# Patient Record
Sex: MALE | Race: WHITE
[De-identification: names, ages, dates, MRNs, and addresses within clinical notes are randomized per-mention and may not be internally consistent; named-entity substitution may affect disease eponyms.]

---

## 2020-07-26 ENCOUNTER — ERX REFILL RESPONSE (OUTPATIENT)
Age: 60
End: 2020-07-26

## 2020-07-26 RX ORDER — LANSOPRAZOLE 30 MG/1
TAKE 1 CAPSULE BY MOUTH ONCE DAILY BEFORE A MEAL CAPSULE, DELAYED RELEASE ORAL
Qty: 90 | Refills: 3 | OUTPATIENT

## 2020-07-26 RX ORDER — LANSOPRAZOLE 30 MG/1
TAKE 1 CAPSULE BY MOUTH ONCE DAILY BEFORE A MEAL CAPSULE, DELAYED RELEASE ORAL
Qty: 90 CAPSULE | Refills: 3 | OUTPATIENT

## 2020-07-27 ENCOUNTER — ERX REFILL RESPONSE (OUTPATIENT)
Age: 60
End: 2020-07-27

## 2020-07-27 RX ORDER — LANSOPRAZOLE 30 MG/1
TAKE 1 CAPSULE BY MOUTH EVERY DAY BEFORE A MEAL CAPSULE, DELAYED RELEASE ORAL
Qty: 90 | Refills: 3 | OUTPATIENT

## 2020-07-27 RX ORDER — LANSOPRAZOLE 30 MG/1
TAKE 1 CAPSULE BY MOUTH EVERY DAY BEFORE A MEAL CAPSULE, DELAYED RELEASE ORAL
Qty: 90 CAPSULE | Refills: 3 | OUTPATIENT

## 2020-07-30 ENCOUNTER — TELEPHONE ENCOUNTER (OUTPATIENT)
Dept: URBAN - METROPOLITAN AREA CLINIC 92 | Facility: CLINIC | Age: 60
End: 2020-07-30

## 2020-07-30 ENCOUNTER — ERX REFILL RESPONSE (OUTPATIENT)
Age: 60
End: 2020-07-30

## 2020-07-30 PROBLEM — 302914006: Status: ACTIVE | Noted: 2020-07-30

## 2020-07-30 RX ORDER — METOPROLOL SUCCINATE 25 MG/1
TABLET, EXTENDED RELEASE ORAL
Qty: 0 | Refills: 0 | Status: ACTIVE | COMMUNITY
Start: 1900-01-01 | End: 1900-01-01

## 2020-07-30 RX ORDER — LISINOPRIL 10 MG/1
TABLET ORAL
Qty: 0 | Refills: 0 | Status: ACTIVE | COMMUNITY
Start: 1900-01-01 | End: 1900-01-01

## 2020-07-30 RX ORDER — LANSOPRAZOLE 30 MG/1
TAKE 1 CAPSULE (30 MG) BY ORAL ROUTE ONCE DAILY BEFORE A MEAL FOR 90 DAYS CAPSULE, DELAYED RELEASE ORAL 1
Qty: 90 | Refills: 3 | Status: ACTIVE | COMMUNITY
Start: 2020-01-30 | End: 2021-01-24

## 2020-07-30 RX ORDER — PANTOPRAZOLE SODIUM 40 MG/1
1 TABLET TABLET, DELAYED RELEASE ORAL ONCE A DAY
Qty: 90 | Refills: 3 | OUTPATIENT
Start: 2020-07-30

## 2020-07-30 RX ORDER — ATORVASTATIN CALCIUM 80 MG/1
TABLET, FILM COATED ORAL
Qty: 0 | Refills: 0 | Status: ACTIVE | COMMUNITY
Start: 1900-01-01 | End: 1900-01-01

## 2020-07-30 RX ORDER — TICAGRELOR 90 MG/1
TAKE 1 TABLET (90 MG) BY ORAL ROUTE 2 TIMES PER DAY TABLET ORAL 2
Qty: 0 | Refills: 0 | Status: ACTIVE | COMMUNITY
Start: 1900-01-01 | End: 1900-01-01

## 2020-07-30 RX ORDER — LANSOPRAZOLE 30 MG/1
TAKE 1 CAPSULE BY MOUTH EVERY DAY BEFORE A MEAL CAPSULE, DELAYED RELEASE ORAL
Qty: 90 CAPSULE | Refills: 3 | Status: ACTIVE | COMMUNITY

## 2021-07-21 ENCOUNTER — ERX REFILL RESPONSE (OUTPATIENT)
Dept: URBAN - METROPOLITAN AREA CLINIC 74 | Facility: CLINIC | Age: 61
End: 2021-07-21

## 2021-07-21 RX ORDER — PANTOPRAZOLE SODIUM 40 MG/1
1 TABLET TABLET, DELAYED RELEASE ORAL ONCE A DAY
Qty: 90 | Refills: 3 | OUTPATIENT

## 2021-07-21 RX ORDER — PANTOPRAZOLE SODIUM 40 MG/1
TAKE ONE TABLET BY MOUTH DAILY TABLET, DELAYED RELEASE ORAL
Qty: 90 TABLET | Refills: 1 | OUTPATIENT

## 2021-10-18 ENCOUNTER — ERX REFILL RESPONSE (OUTPATIENT)
Dept: URBAN - METROPOLITAN AREA CLINIC 74 | Facility: CLINIC | Age: 61
End: 2021-10-18

## 2021-10-18 RX ORDER — PANTOPRAZOLE SODIUM 40 MG/1
TAKE 1 TABLET BY MOUTH EVERY DAY TABLET, DELAYED RELEASE ORAL
Qty: 90 TABLET | Refills: 1 | OUTPATIENT

## 2021-10-18 RX ORDER — PANTOPRAZOLE SODIUM 40 MG/1
TAKE ONE TABLET BY MOUTH DAILY TABLET, DELAYED RELEASE ORAL
Qty: 90 TABLET | Refills: 1 | OUTPATIENT

## 2022-01-24 ENCOUNTER — ERX REFILL RESPONSE (OUTPATIENT)
Dept: URBAN - METROPOLITAN AREA CLINIC 74 | Facility: CLINIC | Age: 62
End: 2022-01-24

## 2022-01-24 RX ORDER — PANTOPRAZOLE SODIUM 40 MG/1
TAKE 1 TABLET BY MOUTH EVERY DAY TABLET, DELAYED RELEASE ORAL
Qty: 90 TABLET | Refills: 1 | OUTPATIENT

## 2022-04-25 ENCOUNTER — ERX REFILL RESPONSE (OUTPATIENT)
Dept: URBAN - METROPOLITAN AREA CLINIC 74 | Facility: CLINIC | Age: 62
End: 2022-04-25

## 2022-04-25 RX ORDER — PANTOPRAZOLE SODIUM 40 MG/1
TAKE 1 TABLET BY MOUTH EVERY DAY TABLET, DELAYED RELEASE ORAL
Qty: 90 TABLET | Refills: 1 | OUTPATIENT

## 2022-07-11 PROBLEM — 16093611000119107: Status: ACTIVE | Noted: 2022-07-11

## 2022-07-11 PROBLEM — 84089009: Status: ACTIVE | Noted: 2022-07-11

## 2022-07-11 PROBLEM — 90458007: Status: ACTIVE | Noted: 2022-07-11

## 2022-07-11 PROBLEM — 196735001: Status: ACTIVE | Noted: 2022-07-11

## 2022-07-11 PROBLEM — 266435005: Status: ACTIVE | Noted: 2022-07-11

## 2022-07-11 PROBLEM — 429430001: Status: ACTIVE | Noted: 2022-07-11

## 2022-07-12 ENCOUNTER — OFFICE VISIT (OUTPATIENT)
Dept: URBAN - METROPOLITAN AREA CLINIC 74 | Facility: CLINIC | Age: 62
End: 2022-07-12
Payer: MEDICARE

## 2022-07-12 VITALS
BODY MASS INDEX: 22.81 KG/M2 | HEART RATE: 74 BPM | OXYGEN SATURATION: 98 % | WEIGHT: 154 LBS | DIASTOLIC BLOOD PRESSURE: 70 MMHG | TEMPERATURE: 97.7 F | HEIGHT: 69 IN | SYSTOLIC BLOOD PRESSURE: 118 MMHG

## 2022-07-12 DIAGNOSIS — K57.30 SIGMOID DIVERTICULOSIS: ICD-10-CM

## 2022-07-12 DIAGNOSIS — K29.30 CHRONIC SUPERFICIAL GASTRITIS WITHOUT BLEEDING: ICD-10-CM

## 2022-07-12 DIAGNOSIS — Z80.0 FAMILY HISTORY OF COLON CANCER: ICD-10-CM

## 2022-07-12 DIAGNOSIS — Z87.19 HISTORY OF BARRETT'S ESOPHAGUS: ICD-10-CM

## 2022-07-12 DIAGNOSIS — I25.2 HISTORY OF MI (MYOCARDIAL INFARCTION): ICD-10-CM

## 2022-07-12 DIAGNOSIS — K44.9 HIATAL HERNIA: ICD-10-CM

## 2022-07-12 DIAGNOSIS — Z79.01 CHRONIC ANTICOAGULATION: ICD-10-CM

## 2022-07-12 DIAGNOSIS — K64.8 INTERNAL HEMORRHOIDS: ICD-10-CM

## 2022-07-12 DIAGNOSIS — K21.9 GERD WITHOUT ESOPHAGITIS: ICD-10-CM

## 2022-07-12 PROCEDURE — 99213 OFFICE O/P EST LOW 20 MIN: CPT | Performed by: INTERNAL MEDICINE

## 2022-07-12 RX ORDER — ATORVASTATIN CALCIUM 80 MG/1
TABLET, FILM COATED ORAL
Qty: 0 | Refills: 0 | Status: ACTIVE | COMMUNITY
Start: 1900-01-01

## 2022-07-12 RX ORDER — METOPROLOL SUCCINATE 25 MG/1
TABLET, EXTENDED RELEASE ORAL
Qty: 0 | Refills: 0 | Status: ACTIVE | COMMUNITY
Start: 1900-01-01

## 2022-07-12 RX ORDER — LANSOPRAZOLE 30 MG/1
TAKE 1 CAPSULE BY MOUTH EVERY DAY BEFORE A MEAL CAPSULE, DELAYED RELEASE ORAL
OUTPATIENT

## 2022-07-12 RX ORDER — PANTOPRAZOLE SODIUM 40 MG/1
TAKE 1 TABLET BY MOUTH EVERY DAY TABLET, DELAYED RELEASE ORAL
Qty: 90 TABLET | Refills: 1 | Status: ACTIVE | COMMUNITY

## 2022-07-12 RX ORDER — TICAGRELOR 90 MG/1
TAKE 1 TABLET (90 MG) BY ORAL ROUTE 2 TIMES PER DAY TABLET ORAL 2
Qty: 0 | Refills: 0 | Status: ACTIVE | COMMUNITY
Start: 1900-01-01

## 2022-07-12 RX ORDER — PANTOPRAZOLE SODIUM 40 MG/1
TAKE 1 TABLET BY MOUTH EVERY DAY TABLET, DELAYED RELEASE ORAL ONCE A DAY
Qty: 90 | Refills: 3

## 2022-07-12 RX ORDER — LISINOPRIL 10 MG/1
TABLET ORAL
Qty: 0 | Refills: 0 | Status: ACTIVE | COMMUNITY
Start: 1900-01-01

## 2022-07-12 RX ORDER — LANSOPRAZOLE 30 MG/1
TAKE 1 CAPSULE BY MOUTH EVERY DAY BEFORE A MEAL CAPSULE, DELAYED RELEASE ORAL
Qty: 90 CAPSULE | Refills: 3 | Status: ACTIVE | COMMUNITY

## 2022-07-12 NOTE — HPI-TODAY'S VISIT:
Today July 12, 2022 the patient returns for a follow-up visit.  The patient was last seen in the office on January 30, 2020 with gastroesophageal reflux, Metzger's esophagus, tobacco chew use, chronic anticoagulation, stented coronary arteries, history of MI and family history of colon cancer.  At the time of the last visit the patient appeared to be doing well, the patient was taking Prevacid and had no upper GI symptoms, patient had a slight change in bowel habit, stools were softer, there was no rectal bleeding and no abdominal pain.  The patient was advised to get an EGD and a screening colonoscopy after we get cardiology clearance to hold the Brilinta.  The patient had an EGD on March 10, 2020 and it revealed normal duodenal mucosa, antral erythema, a small hiatal hernia and normal esophageal mucosa the colonoscopy performed on the same day revealed sigmoid diverticulosis, small nonbleeding internal hemorrhoids, the rest of the colon was normal and the patient was advised to get a colonoscopy in 5 years.  Biopsies revealed mild chronic H. pylori negative gastritis and squamous epithelium with no significant histopathology, there was no intestinal metaplasia and no intraepithelial eosinophiles.  Today the patient returns to the office stating that he is doing well, denies having any dysphagia, odynophagia, nausea, vomiting, heartburn.  The patient remains on pantoprazole 40 mg daily.  The patient has been instructed to follow antireflux measures and diet and will remain on pantoprazole 40 mg.  The patient will be due to have a surveillance EGD in view of his past history of Metzger's in 2023.  The patient's bowel movements remain normal, he denied having any diarrhea, constipation, rectal bleeding or hematochezia.  His next colonoscopy will be due in 2025.

## 2023-06-24 ENCOUNTER — ERX REFILL RESPONSE (OUTPATIENT)
Dept: URBAN - METROPOLITAN AREA CLINIC 74 | Facility: CLINIC | Age: 63
End: 2023-06-24

## 2023-06-24 RX ORDER — PANTOPRAZOLE SODIUM 40 MG/1
TAKE 1 TABLET BY MOUTH EVERY DAY FOR 90 DAYS TABLET, DELAYED RELEASE ORAL
Qty: 90 TABLET | Refills: 3 | OUTPATIENT

## 2023-06-24 RX ORDER — PANTOPRAZOLE SODIUM 40 MG/1
TAKE 1 TABLET BY MOUTH EVERY DAY TABLET, DELAYED RELEASE ORAL ONCE A DAY
Qty: 90 | Refills: 3 | OUTPATIENT

## 2023-12-14 ENCOUNTER — CLAIMS CREATED FROM THE CLAIM WINDOW (OUTPATIENT)
Dept: URBAN - METROPOLITAN AREA CLINIC 74 | Facility: CLINIC | Age: 63
End: 2023-12-14
Payer: MEDICARE

## 2023-12-14 ENCOUNTER — LAB OUTSIDE AN ENCOUNTER (OUTPATIENT)
Dept: URBAN - METROPOLITAN AREA CLINIC 74 | Facility: CLINIC | Age: 63
End: 2023-12-14

## 2023-12-14 VITALS
HEIGHT: 69 IN | SYSTOLIC BLOOD PRESSURE: 138 MMHG | WEIGHT: 165 LBS | HEART RATE: 68 BPM | BODY MASS INDEX: 24.44 KG/M2 | OXYGEN SATURATION: 98 % | TEMPERATURE: 98.6 F | DIASTOLIC BLOOD PRESSURE: 90 MMHG

## 2023-12-14 DIAGNOSIS — R12 HEARTBURN: ICD-10-CM

## 2023-12-14 DIAGNOSIS — K64.8 INTERNAL HEMORRHOIDS: ICD-10-CM

## 2023-12-14 DIAGNOSIS — K44.9 HIATAL HERNIA: ICD-10-CM

## 2023-12-14 DIAGNOSIS — K29.30 CHRONIC SUPERFICIAL GASTRITIS WITHOUT BLEEDING: ICD-10-CM

## 2023-12-14 DIAGNOSIS — K57.30 SIGMOID DIVERTICULOSIS: ICD-10-CM

## 2023-12-14 DIAGNOSIS — K21.9 GERD WITHOUT ESOPHAGITIS: ICD-10-CM

## 2023-12-14 DIAGNOSIS — Z80.0 FAMILY HISTORY OF COLON CANCER: ICD-10-CM

## 2023-12-14 DIAGNOSIS — Z87.19 HISTORY OF BARRETT'S ESOPHAGUS: ICD-10-CM

## 2023-12-14 DIAGNOSIS — I25.2 HISTORY OF MI (MYOCARDIAL INFARCTION): ICD-10-CM

## 2023-12-14 PROBLEM — 16331000: Status: ACTIVE | Noted: 2023-12-14

## 2023-12-14 PROCEDURE — 99213 OFFICE O/P EST LOW 20 MIN: CPT | Performed by: INTERNAL MEDICINE

## 2023-12-14 RX ORDER — PANTOPRAZOLE SODIUM 40 MG/1
TAKE 1 TABLET BY MOUTH EVERY DAY FOR 90 DAYS TABLET, DELAYED RELEASE ORAL
Qty: 90 TABLET | Refills: 3 | Status: ACTIVE | COMMUNITY

## 2023-12-14 RX ORDER — PANTOPRAZOLE SODIUM 40 MG/1
1 TABLET TABLET, DELAYED RELEASE ORAL ONCE A DAY
Qty: 90 TABLET | Refills: 3

## 2023-12-14 RX ORDER — LISINOPRIL 10 MG/1
TABLET ORAL
Qty: 0 | Refills: 0 | Status: ACTIVE | COMMUNITY
Start: 1900-01-01

## 2023-12-14 RX ORDER — FAMOTIDINE 40 MG/1
1 TABLET AT BEDTIME TABLET, FILM COATED ORAL ONCE A DAY
Qty: 90 TABLET | Refills: 3 | OUTPATIENT
Start: 2023-12-14

## 2023-12-14 RX ORDER — ATORVASTATIN CALCIUM 80 MG/1
TABLET, FILM COATED ORAL
Qty: 0 | Refills: 0 | Status: ACTIVE | COMMUNITY
Start: 1900-01-01

## 2023-12-14 RX ORDER — METOPROLOL SUCCINATE 25 MG/1
TABLET, EXTENDED RELEASE ORAL
Qty: 0 | Refills: 0 | Status: ACTIVE | COMMUNITY
Start: 1900-01-01

## 2023-12-14 NOTE — HPI-TODAY'S VISIT:
Today July 12, 2022 the patient returns for a follow-up visit.  The patient was last seen in the office on January 30, 2020 with gastroesophageal reflux, Metzegr's esophagus, tobacco chew use, chronic anticoagulation, stented coronary arteries, history of MI and family history of colon cancer.  At the time of the last visit the patient appeared to be doing well, the patient was taking Prevacid and had no upper GI symptoms, patient had a slight change in bowel habit, stools were softer, there was no rectal bleeding and no abdominal pain.  The patient was advised to get an EGD and a screening colonoscopy after we get cardiology clearance to hold the Brilinta.  The patient had an EGD on March 10, 2020 and it revealed normal duodenal mucosa, antral erythema, a small hiatal hernia and normal esophageal mucosa the colonoscopy performed on the same day revealed sigmoid diverticulosis, small nonbleeding internal hemorrhoids, the rest of the colon was normal and the patient was advised to get a colonoscopy in 5 years.  Biopsies revealed mild chronic H. pylori negative gastritis and squamous epithelium with no significant histopathology, there was no intestinal metaplasia and no intraepithelial eosinophiles.  Today the patient returns to the office stating that he is doing well, denies having any dysphagia, odynophagia, nausea, vomiting, heartburn.  The patient remains on pantoprazole 40 mg daily.  The patient has been instructed to follow antireflux measures and diet and will remain on pantoprazole 40 mg.  The patient will be due to have a surveillance EGD in view of his past history of Metzger's in 2023.  The patient's bowel movements remain normal, he denied having any diarrhea, constipation, rectal bleeding or hematochezia.  His next colonoscopy will be due in 2025.  Today December 14, 2023 the patient returns for a follow-up visit, the patient was last seen on July 12, 2022 with GERD without esophagitis, history of Metzger's esophagus, hiatal hernia, chronic superficial gastritis without bleeding, sigmoid diverticulosis, internal hemorrhoids, family history of colon cancer, history of MI on chronic anticoagulation.  At the time of the last visit the patient appeared to be doing well, denied having any upper GI symptoms such as dysphagia, odynophagia, nausea, vomiting, heartburn.  The patient was taking pantoprazole 40 mg daily. The patient was instructed to follow antireflux measures and diet and remain on pantoprazole 40 mg daily.  The patient was advised to get an EGD in view of the past history of Metzger's esophagus.    The last EGD was performed on March 10, 2020 and it revealed a small hiatal hernia mild chronic gastritis and distal esophageal biopsies showed squamous epithelium with no significant histopathology, no intraepithelial eosinophiles were seen, biopsies obtained in 2017 were consistent with mild reflux esophagitis negative for intestinal metaplasia.  Today the patient returns to the office stating that he ran out of pantoprazole, he tried to take his wife's PPI with no improvement.  He claims that while taking the pantoprazole it stopped working as good as it used to be, he used to take 40 mg in the a.m. and had no symptoms for 24 hours, lately he started getting acid reflux by evening time.  The patient was not following antireflux measures or diet, denied having any dysphagia, odynophagia, had intermittent episodes of nausea mostly on an empty stomach had occasional vomiting of gastric contents with no blood.  There was no melena.The patient is currently taking a low-dose aspirin, there is no history of alcohol use, does dip snuff, there is no tobacco smoking. Currently the patient is having type V or type VI stools on the [place] scale, the patient's last colonoscopy in 2020 failed to show any polyps.  The patient did have sigmoid diverticulosis and nonbleeding internal hemorrhoids.  Currently he defecates once a day and there has been no hematochezia.  There has been no unexplained weight loss. The patient has been advised to follow antireflux measures and diet, will add famotidine 40 mg at bedtime to pantoprazole 40 mg in the a.m.  The patient will be scheduled to have a follow-up EGD.  Benefits potential complications and alternatives to EGD were disclosed.  The patient will be due to have a colonoscopy in March 2025. The patient should remain on a high-fiber diet.

## 2024-02-13 ENCOUNTER — EGD (OUTPATIENT)
Dept: URBAN - METROPOLITAN AREA SURGERY CENTER 30 | Facility: SURGERY CENTER | Age: 64
End: 2024-02-13
Payer: MEDICARE

## 2024-02-13 ENCOUNTER — LAB (OUTPATIENT)
Dept: URBAN - METROPOLITAN AREA CLINIC 4 | Facility: CLINIC | Age: 64
End: 2024-02-13
Payer: MEDICARE

## 2024-02-13 DIAGNOSIS — K31.89 OTHER DISEASES OF STOMACH AND DUODENUM: ICD-10-CM

## 2024-02-13 DIAGNOSIS — K21.9 ACID REFLUX: ICD-10-CM

## 2024-02-13 DIAGNOSIS — K29.70 GASTRITIS, UNSPECIFIED, WITHOUT BLEEDING: ICD-10-CM

## 2024-02-13 DIAGNOSIS — K22.2 ACQUIRED ESOPHAGEAL RING: ICD-10-CM

## 2024-02-13 PROCEDURE — 43239 EGD BIOPSY SINGLE/MULTIPLE: CPT | Performed by: INTERNAL MEDICINE

## 2024-02-13 PROCEDURE — 88312 SPECIAL STAINS GROUP 1: CPT | Performed by: PATHOLOGY

## 2024-02-13 PROCEDURE — 88305 TISSUE EXAM BY PATHOLOGIST: CPT | Performed by: PATHOLOGY

## 2024-03-11 ENCOUNTER — OV EP (OUTPATIENT)
Dept: URBAN - METROPOLITAN AREA CLINIC 74 | Facility: CLINIC | Age: 64
End: 2024-03-11

## 2024-03-13 PROBLEM — 37693008: Status: ACTIVE | Noted: 2024-03-13

## 2024-03-21 ENCOUNTER — OV EP (OUTPATIENT)
Dept: URBAN - METROPOLITAN AREA CLINIC 74 | Facility: CLINIC | Age: 64
End: 2024-03-21
Payer: MEDICARE

## 2024-03-21 VITALS
BODY MASS INDEX: 24.44 KG/M2 | WEIGHT: 165 LBS | HEIGHT: 69 IN | DIASTOLIC BLOOD PRESSURE: 78 MMHG | HEART RATE: 74 BPM | SYSTOLIC BLOOD PRESSURE: 118 MMHG | TEMPERATURE: 97.2 F

## 2024-03-21 DIAGNOSIS — K29.60 CHEMICAL GASTRITIS: ICD-10-CM

## 2024-03-21 DIAGNOSIS — I25.2 HISTORY OF MI (MYOCARDIAL INFARCTION): ICD-10-CM

## 2024-03-21 DIAGNOSIS — Z80.0 FAMILY HISTORY OF COLON CANCER: ICD-10-CM

## 2024-03-21 DIAGNOSIS — K57.30 SIGMOID DIVERTICULOSIS: ICD-10-CM

## 2024-03-21 DIAGNOSIS — K64.8 INTERNAL HEMORRHOIDS: ICD-10-CM

## 2024-03-21 DIAGNOSIS — R12 HEARTBURN: ICD-10-CM

## 2024-03-21 DIAGNOSIS — K21.9 GERD WITHOUT ESOPHAGITIS: ICD-10-CM

## 2024-03-21 DIAGNOSIS — K44.9 HIATAL HERNIA: ICD-10-CM

## 2024-03-21 DIAGNOSIS — Z87.19 HISTORY OF BARRETT'S ESOPHAGUS: ICD-10-CM

## 2024-03-21 PROCEDURE — 99213 OFFICE O/P EST LOW 20 MIN: CPT | Performed by: INTERNAL MEDICINE

## 2024-03-21 RX ORDER — FAMOTIDINE 40 MG/1
1 TABLET AT BEDTIME TABLET, FILM COATED ORAL ONCE A DAY
Qty: 90 TABLET | Refills: 3 | Status: ACTIVE | COMMUNITY
Start: 2023-12-14

## 2024-03-21 RX ORDER — PANTOPRAZOLE SODIUM 40 MG/1
1 TABLET TABLET, DELAYED RELEASE ORAL ONCE A DAY
OUTPATIENT

## 2024-03-21 RX ORDER — PANTOPRAZOLE SODIUM 40 MG/1
1 TABLET TABLET, DELAYED RELEASE ORAL ONCE A DAY
Qty: 90 TABLET | Refills: 3 | Status: ACTIVE | COMMUNITY

## 2024-03-21 RX ORDER — LISINOPRIL 10 MG/1
TABLET ORAL
Qty: 0 | Refills: 0 | Status: ACTIVE | COMMUNITY
Start: 1900-01-01

## 2024-03-21 RX ORDER — FAMOTIDINE 40 MG/1
1 TABLET AT BEDTIME TABLET, FILM COATED ORAL ONCE A DAY
OUTPATIENT
Start: 2023-12-14

## 2024-03-21 RX ORDER — METOPROLOL SUCCINATE 25 MG/1
TABLET, EXTENDED RELEASE ORAL
Qty: 0 | Refills: 0 | Status: ACTIVE | COMMUNITY
Start: 1900-01-01

## 2024-03-21 RX ORDER — ATORVASTATIN CALCIUM 80 MG/1
TABLET, FILM COATED ORAL
Qty: 0 | Refills: 0 | Status: ACTIVE | COMMUNITY
Start: 1900-01-01

## 2024-03-21 NOTE — HPI-TODAY'S VISIT:
Today July 12, 2022 the patient returns for a follow-up visit.  The patient was last seen in the office on January 30, 2020 with gastroesophageal reflux, Metzger's esophagus, tobacco chew use, chronic anticoagulation, stented coronary arteries, history of MI and family history of colon cancer.  At the time of the last visit the patient appeared to be doing well, the patient was taking Prevacid and had no upper GI symptoms, patient had a slight change in bowel habit, stools were softer, there was no rectal bleeding and no abdominal pain.  The patient was advised to get an EGD and a screening colonoscopy after we get cardiology clearance to hold the Brilinta.  The patient had an EGD on March 10, 2020 and it revealed normal duodenal mucosa, antral erythema, a small hiatal hernia and normal esophageal mucosa the colonoscopy performed on the same day revealed sigmoid diverticulosis, small nonbleeding internal hemorrhoids, the rest of the colon was normal and the patient was advised to get a colonoscopy in 5 years.  Biopsies revealed mild chronic H. pylori negative gastritis and squamous epithelium with no significant histopathology, there was no intestinal metaplasia and no intraepithelial eosinophiles.  Today the patient returns to the office stating that he is doing well, denies having any dysphagia, odynophagia, nausea, vomiting, heartburn.  The patient remains on pantoprazole 40 mg daily.  The patient has been instructed to follow antireflux measures and diet and will remain on pantoprazole 40 mg.  The patient will be due to have a surveillance EGD in view of his past history of Metzger's in 2023.  The patient's bowel movements remain normal, he denied having any diarrhea, constipation, rectal bleeding or hematochezia.  His next colonoscopy will be due in 2025.  Today December 14, 2023 the patient returns for a follow-up visit, the patient was last seen on July 12, 2022 with GERD without esophagitis, history of Metzger's esophagus, hiatal hernia, chronic superficial gastritis without bleeding, sigmoid diverticulosis, internal hemorrhoids, family history of colon cancer, history of MI on chronic anticoagulation.  At the time of the last visit the patient appeared to be doing well, denied having any upper GI symptoms such as dysphagia, odynophagia, nausea, vomiting, heartburn.  The patient was taking pantoprazole 40 mg daily. The patient was instructed to follow antireflux measures and diet and remain on pantoprazole 40 mg daily.  The patient was advised to get an EGD in view of the past history of Metzger's esophagus.    The last EGD was performed on March 10, 2020 and it revealed a small hiatal hernia mild chronic gastritis and distal esophageal biopsies showed squamous epithelium with no significant histopathology, no intraepithelial eosinophiles were seen, biopsies obtained in 2017 were consistent with mild reflux esophagitis negative for intestinal metaplasia.  Today the patient returns to the office stating that he ran out of pantoprazole, he tried to take his wife's PPI with no improvement.  He claims that while taking the pantoprazole it stopped working as good as it used to be, he used to take 40 mg in the a.m. and had no symptoms for 24 hours, lately he started getting acid reflux by evening time.  The patient was not following antireflux measures or diet, denied having any dysphagia, odynophagia, had intermittent episodes of nausea mostly on an empty stomach had occasional vomiting of gastric contents with no blood.  There was no melena.The patient is currently taking a low-dose aspirin, there is no history of alcohol use, does dip snuff, there is no tobacco smoking. Currently the patient is having type V or type VI stools on the [place] scale, the patient's last colonoscopy in 2020 failed to show any polyps.  The patient did have sigmoid diverticulosis and nonbleeding internal hemorrhoids.  Currently he defecates once a day and there has been no hematochezia.  There has been no unexplained weight loss. The patient has been advised to follow antireflux measures and diet, will add famotidine 40 mg at bedtime to pantoprazole 40 mg in the a.m.  The patient will be scheduled to have a follow-up EGD.  Benefits potential complications and alternatives to EGD were disclosed.  The patient will be due to have a colonoscopy in March 2025. The patient should remain on a high-fiber diet.  Today March 21, 2024 the patient returns for a follow-up visit, the patient was last seen on December 14, 2023 with heartburn, GERD without esophagitis, history of Metzger's esophagus, hiatal hernia, chronic superficial gastritis without bleeding, sigmoid diverticulosis, internal hemorrhoids, family history of colon cancer and history of an MI.  At the time of the visit the patient states that that he had ran out of of pantoprazole and try to take his wife's PPIs with no improvement, he also claimed that while taking pantoprazole it stopped working as good as it used to, he used to take 40 mg in the a.m. and had no symptoms for 24 hours. He stated that he has acid reflux was worse in the evening. The patient was not following antireflux measures or diet, he did deny having any dysphagia or odynophagia, did have intermittent episodes of nausea mostly on an empty stomach and occasional vomiting of gastric contents with no blocks. The patient was taking a low-dose aspirin, there was no history of alcohol use, does the snore, there is no tobacco smoking. The patient was having type V or type VI stools on the [place] scale, last colonoscopy in 2020 failed to show any polyps.  The patient did have sigmoid diverticulosis and nonbleeding internal hemorrhoids. The patient denied having any hematochezia or unexplained weight loss. At the time we advised the patient to follow antireflux measures and diet, we advised the patient to take pantoprazole 40 mg in the a.m. and famotidine 40 mg at bedtime, we discussed antireflux measures and diet and schedule the patient for an EGD.   The patient was advised to remain on a high-fiber diet and have a colonoscopy in March 2025. The EGD was performed on February 13, 2024 and it revealed normal duodenal mucosa, antral erythema, medium size hiatal hernia, and nonobstructive Schatzki's ring and normal-looking esophagus. Biopsies revealed mild chemical reactive gastropathy H. pylori negative negative for intestinal metaplasia and squamocolumnar mucosa with reflux type changes with no evidence of Metzger's or eosinophilic esophagitis  Today's to the office the patient return stated that he is doing well, as long as he takes the pantoprazole 40 mg in the a.m. and famotidine 40 mg at bedtime he has no heartburn. The patient denies having any dysphagia, odynophagia, nausea, vomiting. I discussed with the patient the recent EGD findings and made him aware that he does have a medium size hiatal hernia, and nonobstructive Schatzki's ring, normal-looking esophagus with no evidence of Metzger's and that he had mild chemical reactive gastropathy H. pylori negative negative for intestinal metaplasia, the mucosa in the esophagus failed to show any abnormalities. The patient agreed to follow-up antireflux measures and diet remain on current medications. The patient will return for a follow-up visit in 1 year or as needed. The patient will be due to have a colonoscopy for history of colonic polyps in 2025.

## 2024-06-10 ENCOUNTER — RX ONLY (RX ONLY)
Age: 64
End: 2024-06-10

## 2024-12-07 ENCOUNTER — ERX REFILL RESPONSE (OUTPATIENT)
Dept: URBAN - METROPOLITAN AREA CLINIC 74 | Facility: CLINIC | Age: 64
End: 2024-12-07

## 2024-12-07 RX ORDER — FAMOTIDINE 40 MG/1
TAKE 1 TABLET BY MOUTH EVERY DAY AT BEDTIME FOR 90 DAYS TABLET, FILM COATED ORAL
Qty: 90 TABLET | Refills: 3 | OUTPATIENT

## 2024-12-07 RX ORDER — FAMOTIDINE 40 MG/1
1 TABLET AT BEDTIME TABLET, FILM COATED ORAL ONCE A DAY
OUTPATIENT

## 2025-03-05 ENCOUNTER — ERX REFILL RESPONSE (OUTPATIENT)
Dept: URBAN - METROPOLITAN AREA CLINIC 74 | Facility: CLINIC | Age: 65
End: 2025-03-05

## 2025-03-05 RX ORDER — PANTOPRAZOLE SODIUM 40 MG/1
TAKE 1 TABLET BY MOUTH EVERY DAY FOR 90 DAYS TABLET, DELAYED RELEASE ORAL
Qty: 90 TABLET | Refills: 3 | OUTPATIENT

## 2025-03-05 RX ORDER — PANTOPRAZOLE SODIUM 40 MG/1
1 TABLET TABLET, DELAYED RELEASE ORAL ONCE A DAY
OUTPATIENT

## 2025-03-12 ENCOUNTER — DASHBOARD ENCOUNTERS (OUTPATIENT)
Age: 65
End: 2025-03-12

## 2025-03-14 ENCOUNTER — LAB OUTSIDE AN ENCOUNTER (OUTPATIENT)
Dept: URBAN - METROPOLITAN AREA CLINIC 74 | Facility: CLINIC | Age: 65
End: 2025-03-14

## 2025-03-14 ENCOUNTER — OFFICE VISIT (OUTPATIENT)
Dept: URBAN - METROPOLITAN AREA CLINIC 74 | Facility: CLINIC | Age: 65
End: 2025-03-14
Payer: MEDICARE

## 2025-03-14 VITALS
HEIGHT: 69 IN | HEART RATE: 83 BPM | BODY MASS INDEX: 22.22 KG/M2 | DIASTOLIC BLOOD PRESSURE: 70 MMHG | SYSTOLIC BLOOD PRESSURE: 132 MMHG | WEIGHT: 150 LBS | OXYGEN SATURATION: 96 %

## 2025-03-14 DIAGNOSIS — I25.2 HISTORY OF MI (MYOCARDIAL INFARCTION): ICD-10-CM

## 2025-03-14 DIAGNOSIS — K57.30 SIGMOID DIVERTICULOSIS: ICD-10-CM

## 2025-03-14 DIAGNOSIS — K44.9 HIATAL HERNIA: ICD-10-CM

## 2025-03-14 DIAGNOSIS — Z80.0 FAMILY HISTORY OF COLON CANCER: ICD-10-CM

## 2025-03-14 DIAGNOSIS — K64.8 INTERNAL HEMORRHOIDS: ICD-10-CM

## 2025-03-14 DIAGNOSIS — Z87.19 HISTORY OF BARRETT'S ESOPHAGUS: ICD-10-CM

## 2025-03-14 DIAGNOSIS — K29.60 CHEMICAL GASTRITIS: ICD-10-CM

## 2025-03-14 DIAGNOSIS — K21.9 GERD WITHOUT ESOPHAGITIS: ICD-10-CM

## 2025-03-14 PROCEDURE — 99213 OFFICE O/P EST LOW 20 MIN: CPT | Performed by: INTERNAL MEDICINE

## 2025-03-14 RX ORDER — LISINOPRIL 10 MG/1
TABLET ORAL
Qty: 0 | Refills: 0 | Status: ACTIVE | COMMUNITY
Start: 1900-01-01

## 2025-03-14 RX ORDER — FAMOTIDINE 40 MG/1
TAKE 1 TABLET BY MOUTH EVERY DAY AT BEDTIME FOR 90 DAYS TABLET, FILM COATED ORAL
Qty: 90 TABLET | Refills: 3 | Status: ACTIVE | COMMUNITY

## 2025-03-14 RX ORDER — METFORMIN HYDROCHLORIDE 500 MG/1
1 TABLET WITH A MEAL TABLET, FILM COATED ORAL TWICE A DAY
Status: ACTIVE | COMMUNITY

## 2025-03-14 RX ORDER — SODIUM, POTASSIUM,MAG SULFATES 17.5-3.13G
AS DIRECTED SOLUTION, RECONSTITUTED, ORAL ORAL
OUTPATIENT
Start: 2025-03-14

## 2025-03-14 RX ORDER — ATORVASTATIN CALCIUM 80 MG/1
TABLET, FILM COATED ORAL
Qty: 0 | Refills: 0 | Status: ACTIVE | COMMUNITY
Start: 1900-01-01

## 2025-03-14 RX ORDER — PANTOPRAZOLE SODIUM 40 MG/1
TAKE 1 TABLET BY MOUTH EVERY DAY FOR 90 DAYS TABLET, DELAYED RELEASE ORAL
Qty: 90 TABLET | Refills: 3 | Status: ACTIVE | COMMUNITY

## 2025-03-14 RX ORDER — PANTOPRAZOLE SODIUM 40 MG/1
TAKE 1 TABLET BY MOUTH EVERY DAY FOR 90 DAYS TABLET, DELAYED RELEASE ORAL ONCE A DAY
Refills: 3

## 2025-03-14 RX ORDER — FAMOTIDINE 40 MG/1
TAKE 1 TABLET BY MOUTH EVERY DAY AT BEDTIME FOR 90 DAYS TABLET, FILM COATED ORAL ONCE A DAY
Refills: 3

## 2025-03-14 NOTE — HPI-TODAY'S VISIT:
Today July 12, 2022 the patient returns for a follow-up visit.  The patient was last seen in the office on January 30, 2020 with gastroesophageal reflux, Metzger's esophagus, tobacco chew use, chronic anticoagulation, stented coronary arteries, history of MI and family history of colon cancer.  At the time of the last visit the patient appeared to be doing well, the patient was taking Prevacid and had no upper GI symptoms, patient had a slight change in bowel habit, stools were softer, there was no rectal bleeding and no abdominal pain.  The patient was advised to get an EGD and a screening colonoscopy after we get cardiology clearance to hold the Brilinta.  The patient had an EGD on March 10, 2020 and it revealed normal duodenal mucosa, antral erythema, a small hiatal hernia and normal esophageal mucosa the colonoscopy performed on the same day revealed sigmoid diverticulosis, small nonbleeding internal hemorrhoids, the rest of the colon was normal and the patient was advised to get a colonoscopy in 5 years.  Biopsies revealed mild chronic H. pylori negative gastritis and squamous epithelium with no significant histopathology, there was no intestinal metaplasia and no intraepithelial eosinophiles.  Today the patient returns to the office stating that he is doing well, denies having any dysphagia, odynophagia, nausea, vomiting, heartburn.  The patient remains on pantoprazole 40 mg daily.  The patient has been instructed to follow antireflux measures and diet and will remain on pantoprazole 40 mg.  The patient will be due to have a surveillance EGD in view of his past history of Metzger's in 2023.  The patient's bowel movements remain normal, he denied having any diarrhea, constipation, rectal bleeding or hematochezia.  His next colonoscopy will be due in 2025.  Today December 14, 2023 the patient returns for a follow-up visit, the patient was last seen on July 12, 2022 with GERD without esophagitis, history of Metzger's esophagus, hiatal hernia, chronic superficial gastritis without bleeding, sigmoid diverticulosis, internal hemorrhoids, family history of colon cancer, history of MI on chronic anticoagulation.  At the time of the last visit the patient appeared to be doing well, denied having any upper GI symptoms such as dysphagia, odynophagia, nausea, vomiting, heartburn.  The patient was taking pantoprazole 40 mg daily. The patient was instructed to follow antireflux measures and diet and remain on pantoprazole 40 mg daily.  The patient was advised to get an EGD in view of the past history of Metzger's esophagus.    The last EGD was performed on March 10, 2020 and it revealed a small hiatal hernia mild chronic gastritis and distal esophageal biopsies showed squamous epithelium with no significant histopathology, no intraepithelial eosinophiles were seen, biopsies obtained in 2017 were consistent with mild reflux esophagitis negative for intestinal metaplasia.  Today the patient returns to the office stating that he ran out of pantoprazole, he tried to take his wife's PPI with no improvement.  He claims that while taking the pantoprazole it stopped working as good as it used to be, he used to take 40 mg in the a.m. and had no symptoms for 24 hours, lately he started getting acid reflux by evening time.  The patient was not following antireflux measures or diet, denied having any dysphagia, odynophagia, had intermittent episodes of nausea mostly on an empty stomach had occasional vomiting of gastric contents with no blood.  There was no melena.The patient is currently taking a low-dose aspirin, there is no history of alcohol use, does dip snuff, there is no tobacco smoking. Currently the patient is having type V or type VI stools on the [place] scale, the patient's last colonoscopy in 2020 failed to show any polyps.  The patient did have sigmoid diverticulosis and nonbleeding internal hemorrhoids.  Currently he defecates once a day and there has been no hematochezia.  There has been no unexplained weight loss. The patient has been advised to follow antireflux measures and diet, will add famotidine 40 mg at bedtime to pantoprazole 40 mg in the a.m.  The patient will be scheduled to have a follow-up EGD.  Benefits potential complications and alternatives to EGD were disclosed.  The patient will be due to have a colonoscopy in March 2025. The patient should remain on a high-fiber diet.  Today March 21, 2024 the patient returns for a follow-up visit, the patient was last seen on December 14, 2023 with heartburn, GERD without esophagitis, history of Metzger's esophagus, hiatal hernia, chronic superficial gastritis without bleeding, sigmoid diverticulosis, internal hemorrhoids, family history of colon cancer and history of an MI.  At the time of the visit the patient states that that he had ran out of of pantoprazole and try to take his wife's PPIs with no improvement, he also claimed that while taking pantoprazole it stopped working as good as it used to, he used to take 40 mg in the a.m. and had no symptoms for 24 hours. He stated that he has acid reflux was worse in the evening. The patient was not following antireflux measures or diet, he did deny having any dysphagia or odynophagia, did have intermittent episodes of nausea mostly on an empty stomach and occasional vomiting of gastric contents with no blocks. The patient was taking a low-dose aspirin, there was no history of alcohol use, does the snore, there is no tobacco smoking. The patient was having type V or type VI stools on the [place] scale, last colonoscopy in 2020 failed to show any polyps.  The patient did have sigmoid diverticulosis and nonbleeding internal hemorrhoids. The patient denied having any hematochezia or unexplained weight loss. At the time we advised the patient to follow antireflux measures and diet, we advised the patient to take pantoprazole 40 mg in the a.m. and famotidine 40 mg at bedtime, we discussed antireflux measures and diet and schedule the patient for an EGD.   The patient was advised to remain on a high-fiber diet and have a colonoscopy in March 2025. The EGD was performed on February 13, 2024 and it revealed normal duodenal mucosa, antral erythema, medium size hiatal hernia, and nonobstructive Schatzki's ring and normal-looking esophagus. Biopsies revealed mild chemical reactive gastropathy H. pylori negative negative for intestinal metaplasia and squamocolumnar mucosa with reflux type changes with no evidence of Metzger's or eosinophilic esophagitis  Today's to the office the patient return stated that he is doing well, as long as he takes the pantoprazole 40 mg in the a.m. and famotidine 40 mg at bedtime he has no heartburn. The patient denies having any dysphagia, odynophagia, nausea, vomiting. I discussed with the patient the recent EGD findings and made him aware that he does have a medium size hiatal hernia, and nonobstructive Schatzki's ring, normal-looking esophagus with no evidence of Metzger's and that he had mild chemical reactive gastropathy H. pylori negative negative for intestinal metaplasia, the mucosa in the esophagus failed to show any abnormalities. The patient agreed to follow-up antireflux measures and diet remain on current medications. The patient will return for a follow-up visit in 1 year or as needed. The patient will be due to have a colonoscopy for history of colonic polyps in 2025.  Today March 14, 2025 the patient returns for a follow-up visit, the patient was last seen on March 21, 2024  with chemical gastritis, hiatal hernia, GERD without esophagitis, heartburn, sigmoid diverticulosis and internal hemorrhoids, family history of colon cancer and history of MI.  At the time of the visit the patient appeared to be doing well, the patient was taking pantoprazole 40 mg in the a.m. and famotidine 40 mg at bedtime and had no heartburn.  The patient denied having dysphagia, odynophagia nausea or vomiting.  At the time we discussed the most recent EG  performed February 2024, the patient was made aware that he had a medium sized hiatal hernia, nonobstructive Schatzki's ring, normal-looking esophagus with no evidence of Metzger's, did have mild chemical reactive gastropathy H. pylori negative negative for intestinal metaplasia, the esophageal mucosa failed to show any other abnormalities.  The patient agreed to follow antireflux measures and diet and remain on the previously mentioned medications.  The patient was advised to get a colonoscopy in 2025 for a family history of colon cancer.  Today the patient returns to the office stating that he is doing well, denies having any dysphagia, odynophagia, nausea vomiting, gets heartburn if he skips taking his medication or does not follow antireflux measures and diet but otherwise he is entirely asymptomatic.  The patient's bowel movements have changed since on metformin, they are more frequent and either type V or type VI on the [place] scale, there is no rectal bleeding or hematochezia.  There is no abdominal pain or unexplained weight loss.  The patient has been advised to continue antireflux measures and diet, will remain on pantoprazole 40 mg in the a.m. and 40 mg of famotidine at bedtime.  The patient will be scheduled to have a colonoscopy, he has family history of colon cancer in a first-degree relative.  I discussed with the patient benefits, alternatives to colonoscopy and potential side effects such as perforation bleeding or missed lesions.  The patient will return for a follow-up visit once he completes his evaluation.

## 2025-04-29 ENCOUNTER — OFFICE VISIT (OUTPATIENT)
Dept: URBAN - METROPOLITAN AREA SURGERY CENTER 30 | Facility: SURGERY CENTER | Age: 65
End: 2025-04-29

## 2025-04-29 RX ORDER — LISINOPRIL 10 MG/1
TABLET ORAL
Qty: 0 | Refills: 0 | Status: ACTIVE | COMMUNITY
Start: 1900-01-01

## 2025-04-29 RX ORDER — METFORMIN HYDROCHLORIDE 500 MG/1
1 TABLET WITH A MEAL TABLET, FILM COATED ORAL TWICE A DAY
Status: ACTIVE | COMMUNITY

## 2025-04-29 RX ORDER — FAMOTIDINE 40 MG/1
TAKE 1 TABLET BY MOUTH EVERY DAY AT BEDTIME FOR 90 DAYS TABLET, FILM COATED ORAL ONCE A DAY
Refills: 3 | Status: ACTIVE | COMMUNITY

## 2025-04-29 RX ORDER — PANTOPRAZOLE SODIUM 40 MG/1
TAKE 1 TABLET BY MOUTH EVERY DAY FOR 90 DAYS TABLET, DELAYED RELEASE ORAL ONCE A DAY
Refills: 3 | Status: ACTIVE | COMMUNITY

## 2025-04-29 RX ORDER — ATORVASTATIN CALCIUM 80 MG/1
TABLET, FILM COATED ORAL
Qty: 0 | Refills: 0 | Status: ACTIVE | COMMUNITY
Start: 1900-01-01

## 2025-04-29 RX ORDER — SODIUM, POTASSIUM,MAG SULFATES 17.5-3.13G
AS DIRECTED SOLUTION, RECONSTITUTED, ORAL ORAL
Status: ACTIVE | COMMUNITY
Start: 2025-03-14

## 2025-05-19 ENCOUNTER — OFFICE VISIT (OUTPATIENT)
Dept: URBAN - METROPOLITAN AREA CLINIC 74 | Facility: CLINIC | Age: 65
End: 2025-05-19

## 2025-05-19 RX ORDER — ATORVASTATIN CALCIUM 80 MG/1
TABLET, FILM COATED ORAL
Qty: 0 | Refills: 0 | Status: ACTIVE | COMMUNITY
Start: 1900-01-01

## 2025-05-19 RX ORDER — PANTOPRAZOLE SODIUM 40 MG/1
TAKE 1 TABLET BY MOUTH EVERY DAY FOR 90 DAYS TABLET, DELAYED RELEASE ORAL ONCE A DAY
Refills: 3 | Status: ACTIVE | COMMUNITY

## 2025-05-19 RX ORDER — SODIUM, POTASSIUM,MAG SULFATES 17.5-3.13G
AS DIRECTED SOLUTION, RECONSTITUTED, ORAL ORAL
OUTPATIENT
Start: 2025-05-07

## 2025-05-19 RX ORDER — LISINOPRIL 10 MG/1
TABLET ORAL
Qty: 0 | Refills: 0 | Status: ACTIVE | COMMUNITY
Start: 1900-01-01

## 2025-05-19 RX ORDER — FAMOTIDINE 40 MG/1
TAKE 1 TABLET BY MOUTH EVERY DAY AT BEDTIME FOR 90 DAYS TABLET, FILM COATED ORAL ONCE A DAY
Refills: 3
Start: 2025-05-07

## 2025-05-19 RX ORDER — METFORMIN HYDROCHLORIDE 500 MG/1
1 TABLET WITH A MEAL TABLET, FILM COATED ORAL TWICE A DAY
Status: ACTIVE | COMMUNITY

## 2025-05-19 RX ORDER — PANTOPRAZOLE SODIUM 40 MG/1
TAKE 1 TABLET BY MOUTH EVERY DAY FOR 90 DAYS TABLET, DELAYED RELEASE ORAL ONCE A DAY
Refills: 3
Start: 2025-05-07

## 2025-05-19 RX ORDER — FAMOTIDINE 40 MG/1
TAKE 1 TABLET BY MOUTH EVERY DAY AT BEDTIME FOR 90 DAYS TABLET, FILM COATED ORAL ONCE A DAY
Refills: 3 | Status: ACTIVE | COMMUNITY

## 2025-05-19 RX ORDER — SODIUM, POTASSIUM,MAG SULFATES 17.5-3.13G
AS DIRECTED SOLUTION, RECONSTITUTED, ORAL ORAL
Status: ACTIVE | COMMUNITY
Start: 2025-03-14

## 2025-05-19 NOTE — HPI-TODAY'S VISIT:
Today July 12, 2022 the patient returns for a follow-up visit.  The patient was last seen in the office on January 30, 2020 with gastroesophageal reflux, Metzger's esophagus, tobacco chew use, chronic anticoagulation, stented coronary arteries, history of MI and family history of colon cancer.  At the time of the last visit the patient appeared to be doing well, the patient was taking Prevacid and had no upper GI symptoms, patient had a slight change in bowel habit, stools were softer, there was no rectal bleeding and no abdominal pain.  The patient was advised to get an EGD and a screening colonoscopy after we get cardiology clearance to hold the Brilinta.  The patient had an EGD on March 10, 2020 and it revealed normal duodenal mucosa, antral erythema, a small hiatal hernia and normal esophageal mucosa the colonoscopy performed on the same day revealed sigmoid diverticulosis, small nonbleeding internal hemorrhoids, the rest of the colon was normal and the patient was advised to get a colonoscopy in 5 years.  Biopsies revealed mild chronic H. pylori negative gastritis and squamous epithelium with no significant histopathology, there was no intestinal metaplasia and no intraepithelial eosinophiles.  Today the patient returns to the office stating that he is doing well, denies having any dysphagia, odynophagia, nausea, vomiting, heartburn.  The patient remains on pantoprazole 40 mg daily.  The patient has been instructed to follow antireflux measures and diet and will remain on pantoprazole 40 mg.  The patient will be due to have a surveillance EGD in view of his past history of Metzger's in 2023.  The patient's bowel movements remain normal, he denied having any diarrhea, constipation, rectal bleeding or hematochezia.  His next colonoscopy will be due in 2025.  Today December 14, 2023 the patient returns for a follow-up visit, the patient was last seen on July 12, 2022 with GERD without esophagitis, history of Metzger's esophagus, hiatal hernia, chronic superficial gastritis without bleeding, sigmoid diverticulosis, internal hemorrhoids, family history of colon cancer, history of MI on chronic anticoagulation.  At the time of the last visit the patient appeared to be doing well, denied having any upper GI symptoms such as dysphagia, odynophagia, nausea, vomiting, heartburn.  The patient was taking pantoprazole 40 mg daily. The patient was instructed to follow antireflux measures and diet and remain on pantoprazole 40 mg daily.  The patient was advised to get an EGD in view of the past history of Metzger's esophagus.    The last EGD was performed on March 10, 2020 and it revealed a small hiatal hernia mild chronic gastritis and distal esophageal biopsies showed squamous epithelium with no significant histopathology, no intraepithelial eosinophiles were seen, biopsies obtained in 2017 were consistent with mild reflux esophagitis negative for intestinal metaplasia.  Today the patient returns to the office stating that he ran out of pantoprazole, he tried to take his wife's PPI with no improvement.  He claims that while taking the pantoprazole it stopped working as good as it used to be, he used to take 40 mg in the a.m. and had no symptoms for 24 hours, lately he started getting acid reflux by evening time.  The patient was not following antireflux measures or diet, denied having any dysphagia, odynophagia, had intermittent episodes of nausea mostly on an empty stomach had occasional vomiting of gastric contents with no blood.  There was no melena.The patient is currently taking a low-dose aspirin, there is no history of alcohol use, does dip snuff, there is no tobacco smoking. Currently the patient is having type V or type VI stools on the Morehouse scale, the patient's last colonoscopy in 2020 failed to show any polyps.  The patient did have sigmoid diverticulosis and nonbleeding internal hemorrhoids.  Currently he defecates once a day and there has been no hematochezia.  There has been no unexplained weight loss. The patient has been advised to follow antireflux measures and diet, will add famotidine 40 mg at bedtime to pantoprazole 40 mg in the a.m.  The patient will be scheduled to have a follow-up EGD.  Benefits potential complications and alternatives to EGD were disclosed.  The patient will be due to have a colonoscopy in March 2025. The patient should remain on a high-fiber diet.  Today March 21, 2024 the patient returns for a follow-up visit, the patient was last seen on December 14, 2023 with heartburn, GERD without esophagitis, history of Metzger's esophagus, hiatal hernia, chronic superficial gastritis without bleeding, sigmoid diverticulosis, internal hemorrhoids, family history of colon cancer and history of an MI.  At the time of the visit the patient states that that he had ran out of of pantoprazole and try to take his wife's PPIs with no improvement, he also claimed that while taking pantoprazole it stopped working as good as it used to, he used to take 40 mg in the a.m. and had no symptoms for 24 hours. He stated that he has acid reflux was worse in the evening. The patient was not following antireflux measures or diet, he did deny having any dysphagia or odynophagia, did have intermittent episodes of nausea mostly on an empty stomach and occasional vomiting of gastric contents with no blocks. The patient was taking a low-dose aspirin, there was no history of alcohol use, does the snore, there is no tobacco smoking. The patient was having type V or type VI stools on the Morehouse scale, last colonoscopy in 2020 failed to show any polyps.  The patient did have sigmoid diverticulosis and nonbleeding internal hemorrhoids. The patient denied having any hematochezia or unexplained weight loss. At the time we advised the patient to follow antireflux measures and diet, we advised the patient to take pantoprazole 40 mg in the a.m. and famotidine 40 mg at bedtime, we discussed antireflux measures and diet and schedule the patient for an EGD.   The patient was advised to remain on a high-fiber diet and have a colonoscopy in March 2025. The EGD was performed on February 13, 2024 and it revealed normal duodenal mucosa, antral erythema, medium size hiatal hernia, and nonobstructive Schatzki's ring and normal-looking esophagus. Biopsies revealed mild chemical reactive gastropathy H. pylori negative negative for intestinal metaplasia and squamocolumnar mucosa with reflux type changes with no evidence of Metzger's or eosinophilic esophagitis  Today's to the office the patient return stated that he is doing well, as long as he takes the pantoprazole 40 mg in the a.m. and famotidine 40 mg at bedtime he has no heartburn. The patient denies having any dysphagia, odynophagia, nausea, vomiting. I discussed with the patient the recent EGD findings and made him aware that he does have a medium size hiatal hernia, and nonobstructive Schatzki's ring, normal-looking esophagus with no evidence of Metzger's and that he had mild chemical reactive gastropathy H. pylori negative negative for intestinal metaplasia, the mucosa in the esophagus failed to show any abnormalities. The patient agreed to follow-up antireflux measures and diet remain on current medications. The patient will return for a follow-up visit in 1 year or as needed. The patient will be due to have a colonoscopy for history of colonic polyps in 2025.  Today March 14, 2025 the patient returns for a follow-up visit, the patient was last seen on March 21, 2024  with chemical gastritis, hiatal hernia, GERD without esophagitis, heartburn, sigmoid diverticulosis and internal hemorrhoids, family history of colon cancer and history of MI.  At the time of the visit the patient appeared to be doing well, the patient was taking pantoprazole 40 mg in the a.m. and famotidine 40 mg at bedtime and had no heartburn.  The patient denied having dysphagia, odynophagia nausea or vomiting.  At the time we discussed the most recent EG  performed February 2024, the patient was made aware that he had a medium sized hiatal hernia, nonobstructive Schatzki's ring, normal-looking esophagus with no evidence of Metzger's, did have mild chemical reactive gastropathy H. pylori negative negative for intestinal metaplasia, the esophageal mucosa failed to show any other abnormalities.  The patient agreed to follow antireflux measures and diet and remain on the previously mentioned medications.  The patient was advised to get a colonoscopy in 2025 for a family history of colon cancer.  Today the patient returns to the office stating that he is doing well, denies having any dysphagia, odynophagia, nausea vomiting, gets heartburn if he skips taking his medication or does not follow antireflux measures and diet but otherwise he is entirely asymptomatic.  The patient's bowel movements have changed since on metformin, they are more frequent and either type V or type VI on the Morehouse scale, there is no rectal bleeding or hematochezia.  There is no abdominal pain or unexplained weight loss.  The patient has been advised to continue antireflux measures and diet, will remain on pantoprazole 40 mg in the a.m. and 40 mg of famotidine at bedtime.  The patient will be scheduled to have a colonoscopy, he has family history of colon cancer in a first-degree relative.  I discussed with the patient benefits, alternatives to colonoscopy and potential side effects such as perforation bleeding or missed lesions.  The patient will return for a follow-up visit once he completes his evaluation.  Today May 19, 2025 the patient returns for a follow-up visit, the patient was last seen on March 14, 2025 with clinical gastritis, GERD, heartburn, Metzger's esophagus, hiatal hernia, sigmoid diverticulosis, internal hemorrhoids, history of MI and family history of colon cancer.  At the time of the visit the patient appeared to be doing well, denied having any dysphagia, odynophagia, nausea vomiting, did get heartburn with sleeping PPIs, also get acid reflux or heartburn if he did not follow antireflux measures and diet, otherwise he was entirely asymptomatic.  The patient's bowel movements did not change after treatment with metformin, though became more frequent and either type V or type VI on Morehouse scale, there was no rectal bleeding or hematochezia no unexplained weight loss.  At the time we discussed antireflux measures and diet, advised the patient to continue taking pantoprazole 40 mg in the morning and famotidine 40 mg at bedtime.  At the time we discussed performing a colonoscopy in view of his family history of colon cancer in a first-degree relative.  The colonoscopy was performed on April 19, 2025 and it revealed sigmoid diverticulosis, normal colonic and ileal mucosa.  At the time the patient was advised to get a follow-up colonoscopy in 5 years.

## 2025-06-05 ENCOUNTER — RX ONLY (RX ONLY)
Age: 65
End: 2025-06-05

## 2025-06-06 ENCOUNTER — APPOINTMENT (OUTPATIENT)
Dept: URBAN - METROPOLITAN AREA CLINIC 159 | Facility: CLINIC | Age: 65
Setting detail: DERMATOLOGY
End: 2025-06-06

## 2025-06-06 DIAGNOSIS — L57.0 ACTINIC KERATOSIS: ICD-10-CM

## 2025-06-06 DIAGNOSIS — L82.1 OTHER SEBORRHEIC KERATOSIS: ICD-10-CM

## 2025-06-06 DIAGNOSIS — D18.0 HEMANGIOMA: ICD-10-CM

## 2025-06-06 DIAGNOSIS — L81.4 OTHER MELANIN HYPERPIGMENTATION: ICD-10-CM

## 2025-06-06 DIAGNOSIS — D22 MELANOCYTIC NEVI: ICD-10-CM

## 2025-06-06 DIAGNOSIS — L57.8 OTHER SKIN CHANGES DUE TO CHRONIC EXPOSURE TO NONIONIZING RADIATION: ICD-10-CM

## 2025-06-06 PROBLEM — D18.01 HEMANGIOMA OF SKIN AND SUBCUTANEOUS TISSUE: Status: ACTIVE | Noted: 2025-06-06

## 2025-06-06 PROBLEM — D22.5 MELANOCYTIC NEVI OF TRUNK: Status: ACTIVE | Noted: 2025-06-06

## 2025-06-06 PROCEDURE — ? ADDITIONAL NOTES

## 2025-06-06 PROCEDURE — ? ORDER FOR PHOTODYNAMIC THERAPY

## 2025-06-06 PROCEDURE — ?

## 2025-06-06 PROCEDURE — ?: Mod: 25

## 2025-06-06 PROCEDURE — ? LIQUID NITROGEN

## 2025-06-06 PROCEDURE — ? COUNSELING

## 2025-06-06 ASSESSMENT — LOCATION DETAILED DESCRIPTION DERM
LOCATION DETAILED: SUPERIOR THORACIC SPINE
LOCATION DETAILED: LEFT SUPERIOR LATERAL MALAR CHEEK
LOCATION DETAILED: SUPERIOR MID FOREHEAD
LOCATION DETAILED: RIGHT INFERIOR TEMPLE
LOCATION DETAILED: LEFT RADIAL DORSAL HAND
LOCATION DETAILED: LEFT LATERAL EYEBROW
LOCATION DETAILED: RIGHT LATERAL SUPERIOR CHEST
LOCATION DETAILED: LEFT INFERIOR UPPER BACK
LOCATION DETAILED: RIGHT RADIAL DORSAL HAND
LOCATION DETAILED: LOWER STERNUM

## 2025-06-06 ASSESSMENT — LOCATION ZONE DERM
LOCATION ZONE: HAND
LOCATION ZONE: FACE
LOCATION ZONE: TRUNK

## 2025-06-06 ASSESSMENT — LOCATION SIMPLE DESCRIPTION DERM
LOCATION SIMPLE: SUPERIOR FOREHEAD
LOCATION SIMPLE: LEFT UPPER BACK
LOCATION SIMPLE: LEFT HAND
LOCATION SIMPLE: UPPER BACK
LOCATION SIMPLE: CHEST
LOCATION SIMPLE: LEFT CHEEK
LOCATION SIMPLE: RIGHT TEMPLE
LOCATION SIMPLE: LEFT EYEBROW
LOCATION SIMPLE: RIGHT HAND

## 2025-06-06 NOTE — PROCEDURE: ADDITIONAL NOTES
Render Risk Assessment In Note?: no
Detail Level: Simple
Additional Notes: Patient has efudex cream. Never applied the cream but has the prescription. Plan to do BID X 3 weeks Winter 2025/2026. Discussed Blue Light treatment this summer before patient leaves to travel [place] country. Will be back in [place] in Oct. or Nov.

## 2025-06-06 NOTE — HPI: FULL BODY SKIN EXAMINATION
What Is The Reason For Today's Visit?: Full Body Skin Examination
What Is The Reason For Today's Visit? (Being Monitored For X): the development of a new lesion
How Severe Are Your Spot(S)?: moderate
What Type Of Note Output Would You Prefer (Optional)?: Standard Output

## 2025-06-06 NOTE — PROCEDURE: ORDER FOR PHOTODYNAMIC THERAPY
Arms And Hands Incubation Time: 2 Hours
Occlusion: No
Location: Face
Neck Incubation Time: 1 Hour
Pdt Type: [place]-U
Photosensitizer: Levulan
Detail Level: Simple
Consent: The procedure and risks were reviewed with the patient including but not limited to: burning, pigmentary changes, pain, blistering, scabbing, redness, and the possibility of needing numerous treatments. Strict photoprotection after the procedure was also discussed.
Face And Scalp Incubation Time: 1 Hour for the face and 2 Hours for the scalp
Frequency Of Pdt: Single Treatment

## 2025-06-06 NOTE — PROCEDURE: LIQUID NITROGEN
Detail Level: Detailed
Post-Care Instructions: I reviewed with the patient in detail post-care instructions. Patient is to wear sunprotection, and avoid picking at any of the treated lesions. Pt may apply Vaseline to crusted or scabbing areas.
Render Post-Care Instructions In Note?: no
Duration Of Freeze Thaw-Cycle (Seconds): 10
Number Of Freeze-Thaw Cycles: 1 freeze-thaw cycle
Show Aperture Variable?: Yes
Consent: The patient's consent was obtained including but not limited to risks of crusting, scabbing, blistering, scarring, darker or lighter pigmentary change, recurrence, incomplete removal and infection.

## 2025-06-25 ENCOUNTER — APPOINTMENT (OUTPATIENT)
Dept: URBAN - METROPOLITAN AREA CLINIC 159 | Facility: CLINIC | Age: 65
Setting detail: DERMATOLOGY
End: 2025-06-25

## 2025-06-25 DIAGNOSIS — L57.0 ACTINIC KERATOSIS: ICD-10-CM

## 2025-06-25 PROCEDURE — ? PDT: BLUE

## 2025-06-25 PROCEDURE — ?

## 2025-06-25 PROCEDURE — OTHER: HCPCS | Mod: JZ

## 2025-06-25 ASSESSMENT — LOCATION DETAILED DESCRIPTION DERM: LOCATION DETAILED: LEFT CENTRAL MALAR CHEEK

## 2025-06-25 ASSESSMENT — LOCATION ZONE DERM: LOCATION ZONE: FACE

## 2025-06-25 ASSESSMENT — LOCATION SIMPLE DESCRIPTION DERM: LOCATION SIMPLE: LEFT CHEEK

## 2025-06-25 NOTE — PROCEDURE: PDT: BLUE
Illumination Time: 00:16:40
Pre-Procedure Text: The treatment areas were cleaned and prepped in the usual fashion.
Show Inventory Tab: Show
Bill For Wasted Drug?: no
Eye Protection Applied?: Yes
Who Performed The Pdt (Staff): anahi
Consent: Written consent obtained.  The risks were reviewed with the patient including but not limited to: pigmentary changes, pain, blistering, scabbing, redness, and the remote possibility of scarring.
Light Source: [place]-U
Incubation Time: 02:00:00
Total Number Of Aks Treated (Optional To Report): 0
Treatment Number: 1
Post-Care Instructions: I reviewed with the patient in detail post-care instructions. Patient is to avoid sunlight for the next 2 days, and wear sun protection. Patients may expect sunburn like redness, discomfort and scabbing.
Comments: [address]
Medical Necessity: Precancerous Lesions
Anesthesia Type: 1% lidocaine with epinephrine
Detail Level: Zone
Who Performed The Pdt?: Performed by Nurse, MA or Aesthetician ([id number])
Incubation Start Time: 1030am
Debridement Text (Will Only Render In Visit Note If You Select Debridement Option Under Who Performed The Pdt Field): Prior to application of the photodynamic medication the hyperkeratotic lesions were curetted to make them more amenable to therapy.
Which Photosensitizer Was Used: Levulan
Incubation End Time: 1230pm